# Patient Record
Sex: MALE | Race: WHITE
[De-identification: names, ages, dates, MRNs, and addresses within clinical notes are randomized per-mention and may not be internally consistent; named-entity substitution may affect disease eponyms.]

---

## 2021-10-05 ENCOUNTER — HOSPITAL ENCOUNTER (OUTPATIENT)
Dept: HOSPITAL 35 - CV | Age: 56
End: 2021-10-05
Attending: INTERNAL MEDICINE
Payer: COMMERCIAL

## 2021-10-05 DIAGNOSIS — R93.1: Primary | ICD-10-CM

## 2021-10-05 NOTE — TST
[facility]
[place]   [address]                   TREADMILL STRESS TEST         
_______________________________________________________________________________
 
Name:       RADHA KENDRICK                   [facility]                     [facility]      [id number]                       Account #:      [id number]  
Admission:  10/05/21    Attend Phys:    Tc Robbins MD 
Discharge:              Date of Birth:  [birth year]  
                                                          Report #: [id number]
                                                                    [id number]
_______________________________________________________________________________
THIS REPORT FOR:  
 
cc:  Go Cooper Trystan J. DO Santiago, Patrick MD [facility]                                         
                                                                       ~
 
--------------- APPROVED REPORT --------------
 
 
Patient Location: Out-Patient, CV HOLDING
Room #: 
 
Stress Nurse: 
 
The patient exercised according to the ANISH protocol for 9 mins; 
achieving a work level of 10.4 METS. 
The resting heart rate of 62  bpm brian to a maximum heart rate of 193 
 bpm. 
This value represent 119% of the maximal, age-predicted heart rate. 
The resting blood pressure of 135/96  mmHg, brian to a maximum blood 
pressure of 160/90 mmHg. 
The exercise test was stopped due to Maximal effort
Normal sinus rhythm at a rate of 90 BPM at baseline with single 
VPC
Significant artifact throughout the protocol unable to interpret for 
ischemia
Recommend repeating the stress test with documented protocol i.e. 
pharmacological stress test.
 
 
Dima Leonardo MD
 
 Conclusion
Normal sinus rhythm at a rate of 90 BPM at baseline with single 
VPC
Significant artifact throughout the protocol, unable to interpret for 
ischemia
Recommend repeating the stress test with augmented protocol i.e. 
pharmacological stress nuclear scan.
 
 
 
 
  <ELECTRONICALLY SIGNED>
   By: Dima Leonardo MD, FACC    
  10/05/21     1430
D: 10/05/21 1430                           _____________________________________
T: 10/05/21 1430                           Dima Leonardo MD, FACC      /INF